# Patient Record
Sex: MALE
[De-identification: names, ages, dates, MRNs, and addresses within clinical notes are randomized per-mention and may not be internally consistent; named-entity substitution may affect disease eponyms.]

---

## 2018-06-20 ENCOUNTER — HOSPITAL ENCOUNTER (OUTPATIENT)
Dept: HOSPITAL 80 - FIMAGING | Age: 83
End: 2018-06-20
Attending: INTERNAL MEDICINE
Payer: COMMERCIAL

## 2018-06-20 DIAGNOSIS — G31.9: Primary | ICD-10-CM

## 2018-06-27 ENCOUNTER — HOSPITAL ENCOUNTER (OUTPATIENT)
Dept: HOSPITAL 80 - FCPNEURO | Age: 83
End: 2018-06-27
Attending: INTERNAL MEDICINE
Payer: COMMERCIAL

## 2018-06-27 DIAGNOSIS — R41.3: Primary | ICD-10-CM

## 2018-06-27 NOTE — CPEEG
[f rep st]



                                                      ELECTROENCEPHALOGRAM





DATE OF STUDY:  06/27/2018



INTERPRETATION:  Normal EEG during wakefulness and sleep.  There were no potentially epileptogenic ab
normalities present during the recording.



REPORT:  This EEG contains 9 Hz alpha activity of the posterior head regions.  There was no abnormal 
activation at rest or during photic stimulation.  The patient became drowsy and fell into light sleep
 during the study.  There was no abnormal activation during drowsiness, sleep, or during times of luc
usal.





Job #:  [id number]

## 2018-10-25 ENCOUNTER — HOSPITAL ENCOUNTER (EMERGENCY)
Dept: HOSPITAL 80 - FED | Age: 83
Discharge: HOME | End: 2018-10-25
Payer: COMMERCIAL

## 2018-10-25 VITALS — SYSTOLIC BLOOD PRESSURE: 143 MMHG | DIASTOLIC BLOOD PRESSURE: 65 MMHG

## 2018-10-25 DIAGNOSIS — I10: ICD-10-CM

## 2018-10-25 DIAGNOSIS — L40.50: ICD-10-CM

## 2018-10-25 DIAGNOSIS — Z85.46: ICD-10-CM

## 2018-10-25 DIAGNOSIS — M79.89: Primary | ICD-10-CM

## 2018-10-25 LAB — PLATELET # BLD: 232 10^3/UL (ref 150–400)

## 2018-10-25 NOTE — EDPHY
H & P


Stated Complaint: increasing bilat pedal edema/mild sob


Time Seen by Provider: 10/25/18 16:37


HPI/ROS: 





CHIEF COMPLAINT:  Leg swelling and weight gain, short of breath





HISTORY OF PRESENT ILLNESS:  This is an 83-year-old male referred to the 

emergency department by his rheumatologist who saw him today.  The patient has 

a history of prostate cancer status post radiation, psoriatic arthritis, 

hypertension, and depression.  He reports a 2 week history of lower extremity 

edema from the knees down, 15 lb weight increase, and mild shortness of breath 

with exertion.  He is able to lie flat.  He has not had chest pain.  No fever. 

He has no history of cardiac or lung disease.





Patient tells me that he had a similar occurrence a few years back, took Lasix, 

and the symptoms resolved.





REVIEW OF SYSTEMS:


A ten system review of systems was performed and is negative with the exception 

of the items mentioned in the HPI.  He tells me that he takes medication for 

Alzheimer's prevention; states that he does not have any memory problems.





Past medical history:


1. Hypertension


2. Psoriatic arthritis


3. Depression


4. Hard of hearing


5. Sleep apnea


6. Prostate cancer status post radiation 





Social history:  He lives with his wife.  Quit smoking 40 years ago.  Rare 

alcohol use.  He is retired clinical psychologist.





General Appearance:  Alert.  Vital signs reviewed.  Blood pressure 140/61.  

Room air pulse ox 91% at triage.


Eyes:  Pupils equal and round, no conjunctival injection, no discharge. 

Anicteric.


ENT, Mouth:  Mucous membranes are moist, no oropharyngeal erythema or edema.


Neck:  No lymphadenopathy, supple.  No JVD.


Respiratory:  Lungs with distant breath sounds.  No wheezes, rales, or rhonchi.


Cardiovascular:  Regular rate and rhythm; no murmur, rub, or gallop.


Gastrointestinal:  Abdomen is obese, soft and nontender, easily reducible 

umbilical hernia, no masses or organomegaly, bowel sounds normal.


Skin:  Warm and dry, no rashes on exposed skin, normal color.


Back:  Nontender to palpation over the thoracolumbar spine. No CVAT.


Extremities:  Bilateral symmetric lower extremity pitting edema to the knees.


Neurological:  Alert and oriented.  Moving all four extremities easily and 

equally.


Psychiatric:  Normal affect.





- Personal History


Current Tetanus Diphtheria and Acellular Pertussis (TDAP): Yes





- Medical/Surgical History


Hx Asthma: No


Hx Chronic Respiratory Disease: No


Hx Diabetes: No


Hx Cardiac Disease: No


Hx Renal Disease: No


Hx Cirrhosis: No


Hx Alcoholism: No


Hx HIV/AIDS: No


Hx Splenectomy or Spleen Trauma: No


Other PMH: ra/htn





- Social History


Smoking Status: Former smoker


Constitutional: 


 Initial Vital Signs











Temperature (C)  36.7 C   10/25/18 16:05


 


Heart Rate  77   10/25/18 16:05


 


Respiratory Rate  18   10/25/18 16:05


 


Blood Pressure  140/61 H  10/25/18 16:05


 


O2 Sat (%)  91 L  10/25/18 16:05








 











O2 Delivery Mode               Room Air














Allergies/Adverse Reactions: 


 





No Known Allergies Allergy (Unverified 10/25/18 16:01)


 








Home Medications: 














 Medication  Instructions  Recorded


 


Amlodipine Besylate  10/25/18


 


Balasasadize  10/25/18


 


FOLIC ACID  10/25/18


 


Furosemide [Lasix] 10 mg PO Q8 #21 tablet 10/25/18


 


Losartan Potassium  10/25/18


 


Lovastatin  10/25/18


 


Methotrexate  10/25/18


 


Metoprolol Succinate  10/25/18


 


Sulfasalazine  10/25/18


 


buPROPion  10/25/18














Medical Decision Making





- Diagnostics


EKG Interpretation: 





12 lead EKG is interpreted in [place] by emergency department physician.  Sinus 

rhythm with rate is 73. No acute ischemic changes.  No previous EKGs for 

comparison.  


ED Course/Re-evaluation: 





 83-year-old male with a history of hypertension who presents with asymmetric 

bilateral lower extremity edema, weight gain, and some mild shortness of breath 

that he notices with exertion.





Evaluation is relatively unrevealing.  Initial troponin is normal.  I do not 

suspect an acute coronary syndrome.  BNP is within normal limits.  Chest x-ray 

shows possible mild fluid overload.  Creatinine is normal.  At the time of this 

dictation urinalysis is pending--do not know if he has protein in his urine.





He does take a calcium channel blocker, amlodipine, which could cause lower 

extremity edema.





I spoke with the on-call physician in his primary care doctor's office.  His 

primary care physician is Dr. Valiente.  The office will reach out to him 

tomorrow morning to schedule close follow up.  In the meantime, he will start 

on Lasix--which he has taken before under similar circumstances.





I reviewed the danger signs that should prompt him to return to the emergency 

department for another evaluation with these including worsening shortness of 

breath and chest pain.


Differential Diagnosis: 





Shortness of breath including but not limited to pulmonary infectious process, 

COPD, asthma, pulmonary embolus and congestive heart failure.  I considered a 

differential diagnosis of bilateral lower extremity edema that includes but is 

not limited to bilateral DVTs (unlikely as is.  Patient does not have active 

cancer and has a Well's score of -2 which puts him at low probability, 

nephrotic syndrome, heart failure, and effect of medication.





- Data Points


Laboratory Results: 


 Laboratory Results





 10/25/18 16:46 





 10/25/18 16:46 








Medications Given: 


 








Discontinued Medications





Furosemide (Lasix)  20 mg PO EDNOW ONE


   Stop: 10/25/18 18:53


   Last Admin: 10/25/18 18:54 Dose:  20 mg





Point of Care Test Results: 


 Chemistry











  10/25/18





  17:10


 


POC Troponin I  0.01 ng/mL ng/mL





   (0.00-0.08) 














Departure





- Departure


Disposition: Home, Routine, Self-Care


Clinical Impression: 


 Bilateral lower extremity edema





Condition: Good


Instructions:  Leg Edema (ED)


Additional Instructions: 


Take the lasix 10 mg three times daily.  This medication will make you urinate.

  It can also lower your blood pressure--so be careful when getting up and down.





You should hear from Dr. Valiente's office tomorrow morning--if not, please call 

the office yourself.  You should be re-evaluated in his office (any of the 

practitioners is fine) tomorrow or Monday.


Referrals: 


Clinton Valiente MD [Primary Care Provider] - As per Instructions


Prescriptions: 


Furosemide [Lasix] 10 mg PO Q8 #21 tablet

## 2018-11-21 ENCOUNTER — HOSPITAL ENCOUNTER (OUTPATIENT)
Dept: HOSPITAL 80 - BHFA | Age: 83
End: 2018-11-21
Attending: INTERNAL MEDICINE
Payer: COMMERCIAL

## 2018-11-21 DIAGNOSIS — I10: ICD-10-CM

## 2018-11-21 DIAGNOSIS — R60.9: Primary | ICD-10-CM

## 2018-12-12 ENCOUNTER — HOSPITAL ENCOUNTER (OUTPATIENT)
Dept: HOSPITAL 80 - FIMAGING | Age: 83
End: 2018-12-12
Attending: INTERNAL MEDICINE
Payer: COMMERCIAL

## 2018-12-12 DIAGNOSIS — R05: Primary | ICD-10-CM

## 2018-12-12 DIAGNOSIS — E29.1: ICD-10-CM

## 2018-12-12 DIAGNOSIS — E66.9: ICD-10-CM

## 2018-12-12 DIAGNOSIS — R06.02: ICD-10-CM

## 2018-12-12 DIAGNOSIS — M25.78: ICD-10-CM

## 2018-12-12 DIAGNOSIS — C61: ICD-10-CM

## 2018-12-12 DIAGNOSIS — R41.3: ICD-10-CM

## 2018-12-17 ENCOUNTER — HOSPITAL ENCOUNTER (OUTPATIENT)
Dept: HOSPITAL 80 - FIMAGING | Age: 83
End: 2018-12-17
Attending: INTERNAL MEDICINE
Payer: COMMERCIAL

## 2018-12-17 DIAGNOSIS — I51.7: ICD-10-CM

## 2018-12-17 DIAGNOSIS — J44.9: Primary | ICD-10-CM

## 2019-01-08 ENCOUNTER — HOSPITAL ENCOUNTER (OUTPATIENT)
Dept: HOSPITAL 80 - FIMAGING | Age: 84
End: 2019-01-08
Attending: INTERNAL MEDICINE
Payer: COMMERCIAL

## 2019-01-08 DIAGNOSIS — J98.4: Primary | ICD-10-CM

## 2021-07-13 NOTE — CPEKG
Test Reason : OPEN

Blood Pressure : ***/*** mmHG

Vent. Rate : 073 BPM     Atrial Rate : 073 BPM

   P-R Int : 246 ms          QRS Dur : 119 ms

    QT Int : 421 ms       P-R-T Axes : 041 -39 028 degrees

   QTc Int : 464 ms

 

Sinus rhythm

Prolonged [place] interval

Nonspecific IVCD with LAD

 

Confirmed by Sveta Leone ([id number]) on 10/25/2018 5:52:06 PM

 

Referred By:             Confirmed By:Sveta Leone Xray Chest 1 View- PORTABLE-Urgent